# Patient Record
Sex: MALE | Race: WHITE | NOT HISPANIC OR LATINO | Employment: FULL TIME | URBAN - METROPOLITAN AREA
[De-identification: names, ages, dates, MRNs, and addresses within clinical notes are randomized per-mention and may not be internally consistent; named-entity substitution may affect disease eponyms.]

---

## 2018-07-20 ENCOUNTER — OFFICE VISIT (OUTPATIENT)
Dept: SLEEP MEDICINE | Facility: CLINIC | Age: 58
End: 2018-07-20
Payer: COMMERCIAL

## 2018-07-20 VITALS
HEART RATE: 74 BPM | BODY MASS INDEX: 40.43 KG/M2 | OXYGEN SATURATION: 94 % | DIASTOLIC BLOOD PRESSURE: 82 MMHG | WEIGHT: 315 LBS | HEIGHT: 74 IN | SYSTOLIC BLOOD PRESSURE: 147 MMHG | RESPIRATION RATE: 16 BRPM

## 2018-07-20 DIAGNOSIS — E66.01 MORBID OBESITY (H): ICD-10-CM

## 2018-07-20 DIAGNOSIS — I10 ESSENTIAL HYPERTENSION: ICD-10-CM

## 2018-07-20 DIAGNOSIS — R29.818 SUSPECTED SLEEP APNEA: Primary | ICD-10-CM

## 2018-07-20 DIAGNOSIS — E66.01 OBESITY, CLASS III, BMI 40-49.9 (MORBID OBESITY) (H): ICD-10-CM

## 2018-07-20 PROCEDURE — 99204 OFFICE O/P NEW MOD 45 MIN: CPT | Performed by: INTERNAL MEDICINE

## 2018-07-20 RX ORDER — ZOLPIDEM TARTRATE 5 MG/1
TABLET ORAL
Qty: 1 TABLET | Refills: 0 | Status: SHIPPED | OUTPATIENT
Start: 2018-07-20 | End: 2018-08-02

## 2018-07-20 RX ORDER — LISINOPRIL AND HYDROCHLOROTHIAZIDE 20; 25 MG/1; MG/1
TABLET ORAL
Refills: 3 | COMMUNITY
Start: 2017-11-29

## 2018-07-20 RX ORDER — METOPROLOL SUCCINATE 25 MG/1
TABLET, EXTENDED RELEASE ORAL
Refills: 0 | COMMUNITY
Start: 2018-07-07 | End: 2023-01-06

## 2018-07-20 NOTE — PATIENT INSTRUCTIONS
1. Sleep study to assess for sleep apnea  2. You can ask DOT examiner if a home sleep test ( Watch PAT) with verifications of chain of custody is acceptable to them   3. Follow up for sleep study     Your BMI is Body mass index is 40.7 kg/(m^2).  Weight management is a personal decision.  If you are interested in exploring weight loss strategies, the following discussion covers the approaches that may be successful. Body mass index (BMI) is one way to tell whether you are at a healthy weight, overweight, or obese. It measures your weight in relation to your height.  A BMI of 18.5 to 24.9 is in the healthy range. A person with a BMI of 25 to 29.9 is considered overweight, and someone with a BMI of 30 or greater is considered obese. More than two-thirds of American adults are considered overweight or obese.  Being overweight or obese increases the risk for further weight gain. Excess weight may lead to heart disease and diabetes.  Creating and following plans for healthy eating and physical activity may help you improve your health.  Weight control is part of healthy lifestyle and includes exercise, emotional health, and healthy eating habits. Careful eating habits lifelong are the mainstay of weight control. Though there are significant health benefits from weight loss, long-term weight loss with diet alone may be very difficult to achieve- studies show long-term success with dietary management in less than 10% of people. Attaining a healthy weight may be especially difficult to achieve in those with severe obesity. In some cases, medications, devices and surgical management might be considered.  What can you do?  If you are overweight or obese and are interested in methods for weight loss, you should discuss this with your provider.     Consider reducing daily calorie intake by 500 calories.     Keep a food journal.     Avoiding skipping meals, consider cutting portions instead.    Diet combined with exercise helps  maintain muscle while optimizing fat loss. Strength training is particularly important for building and maintaining muscle mass. Exercise helps reduce stress, increase energy, and improves fitness. Increasing exercise without diet control, however, may not burn enough calories to loose weight.       Start walking three days a week 10-20 minutes at a time    Work towards walking thirty minutes five days a week     Eventually, increase the speed of your walking for 1-2 minutes at time    In addition, we recommend that you review healthy lifestyles and methods for weight loss available through the National Institutes of Health patient information sites:  http://win.niddk.nih.gov/publications/index.htm    And look into health and wellness programs that may be available through your health insurance provider, employer, local community center, or cedric club.    Weight management plan: Patient was referred to their PCP to discuss a diet and exercise plan.       Patient to follow up with Primary Care provider regarding elevated blood pressure.

## 2018-07-20 NOTE — MR AVS SNAPSHOT
After Visit Summary   7/20/2018    Bk Bhatia    MRN: [id number]           Patient Information     Date Of Birth          1960        Visit Information        Provider Department      7/20/2018 12:30 PM Izaiah Kohler MD [facility] Instructions    1. Sleep study to assess for sleep apnea  2. You can ask DOT examiner if a home sleep test ( Watch PAT) with verifications of chain of custody is acceptable to them   3. Follow up for sleep study     Your BMI is Body mass index is 40.7 kg/(m^2).  Weight management is a personal decision.  If you are interested in exploring weight loss strategies, the following discussion covers the approaches that may be successful. Body mass index (BMI) is one way to tell whether you are at a healthy weight, overweight, or obese. It measures your weight in relation to your height.  A BMI of 18.5 to 24.9 is in the healthy range. A person with a BMI of 25 to 29.9 is considered overweight, and someone with a BMI of 30 or greater is considered obese. More than two-thirds of American adults are considered overweight or obese.  Being overweight or obese increases the risk for further weight gain. Excess weight may lead to heart disease and diabetes.  Creating and following plans for healthy eating and physical activity may help you improve your health.  Weight control is part of healthy lifestyle and includes exercise, emotional health, and healthy eating habits. Careful eating habits lifelong are the mainstay of weight control. Though there are significant health benefits from weight loss, long-term weight loss with diet alone may be very difficult to achieve- studies show long-term success with dietary management in less than 10% of people. Attaining a healthy weight may be especially difficult to achieve in those with severe obesity. In some cases, medications, devices and surgical management might be considered.  What can you do?  If you  are overweight or obese and are interested in methods for weight loss, you should discuss this with your provider.     Consider reducing daily calorie intake by 500 calories.     Keep a food journal.     Avoiding skipping meals, consider cutting portions instead.    Diet combined with exercise helps maintain muscle while optimizing fat loss. Strength training is particularly important for building and maintaining muscle mass. Exercise helps reduce stress, increase energy, and improves fitness. Increasing exercise without diet control, however, may not burn enough calories to loose weight.       Start walking three days a week 10-20 minutes at a time    Work towards walking thirty minutes five days a week     Eventually, increase the speed of your walking for 1-2 minutes at time    In addition, we recommend that you review healthy lifestyles and methods for weight loss available through the National Institutes of Health patient information sites:  http://win.niddk.nih.gov/publications/index.htm    And look into health and wellness programs that may be available through your health insurance provider, employer, local community center, or cedric club.    Weight management plan: Patient was referred to their PCP to discuss a diet and exercise plan.       Patient to follow up with Primary Care provider regarding elevated blood pressure.                  Follow-ups after your visit        Your next 10 appointments already scheduled     Jul 31, 2018  8:00 PM CDT   PSG Split with SLEEP LAB, BED TWO   [facility]    [address]  [facility] [address]   [phone]            Aug 15, 2018  2:30 PM CDT   Return Sleep Patient with PAMELLA Hoff   [facility]    [address]  [facility] [address]   [phone]              Who to contact     If you have questions or need follow up information about  "today's clinic visit or your schedule please contact [facility] directly at [phone].  Normal or non-critical lab and imaging results will be communicated to you by MyChart, letter or phone within 4 business days after the clinic has received the results. If you do not hear from us within 7 days, please contact the clinic through MyChart or phone. If you have a critical or abnormal lab result, we will notify you by phone as soon as possible.  Submit refill requests through [employer] or call your pharmacy and they will forward the refill request to us. Please allow 3 business days for your refill to be completed.          Additional Information About Your Visit        Narushart Information     [employer] lets you send messages to your doctor, view your test results, renew your prescriptions, schedule appointments and more. To sign up, go to [email or web address] . Click on \"Log in\" on the left side of the screen, which will take you to the Welcome page. Then click on \"Sign up Now\" on the right side of the page.     You will be asked to enter the access code listed below, as well as some personal information. Please follow the directions to create your username and password.     Your access code is: [email or web address]  Expires: 10/18/2018 12:58 PM     Your access code will  in 90 days. If you need help or a new code, please call your [place] clinic or [phone].        Care EveryWhere ID     This is your Care EveryWhere ID. This could be used by other organizations to access your [place] medical records  [id number]        Your Vitals Were     Pulse Respirations Height Pulse Oximetry BMI (Body Mass Index)       74 16 1.88 m (6' 2\") 94% 40.7 kg/m2        Blood Pressure from Last 3 Encounters:   18 147/82    Weight from Last 3 Encounters:   18 143.8 kg (317 lb)              Today, you had the following     No orders found for display       Primary Care Provider Fax #    Physician No " Ref-Primary [phone]       No address on file        Equal Access to Services     LENIN HERRERAQUINTIN : Hadii rajani sanders aneta Flower, wadanielda davidwilderha, prabhu cirotayler jadaprashant, catalina johnsonin hayaazari eliasjeanine jasminapaulinoneema millanamyzari jc. So [facility] [phone].    ATENCIÓN: Si habla español, tiene a evangelista disposición servicios gratuitos de asistencia lingüística. Llame al [phone].    We comply with applicable federal civil rights laws and [place] laws. We do not discriminate on the basis of race, color, national origin, age, disability, sex, sexual orientation, or gender identity.            Thank you!     Thank you for choosing [facility]  for your care. Our goal is always to provide you with excellent care. Hearing back from our patients is one way we can continue to improve our services. Please take a few minutes to complete the written survey that you may receive in the mail after your visit with us. Thank you!             Your Updated Medication List - Protect others around you: Learn how to safely use, store and throw away your medicines at www.disposemymeds.org.          This list is accurate as of 7/20/18  1:15 PM.  Always use your most recent med list.                   Brand Name Dispense Instructions for use Diagnosis    lisinopril-hydrochlorothiazide 20-25 MG per tablet    PRINZIDE/ZESTORETIC     TK 1 T PO QD        metoprolol succinate 25 MG 24 hr tablet    TOPROL-XL     TK 1 T PO QD

## 2018-07-20 NOTE — NURSING NOTE
"Chief Complaint   Patient presents with     Sleep Problem     Referrd by DOT       Initial /80  Pulse 74  Resp 16  Ht 1.88 m (6' 2\")  Wt 143.8 kg (317 lb)  SpO2 94%  BMI 40.7 kg/m2 Estimated body mass index is 40.7 kg/(m^2) as calculated from the following:    Height as of this encounter: 1.88 m (6' 2\").    Weight as of this encounter: 143.8 kg (317 lb).    Medication Reconciliation: complete    Neck circumference: 17.5 inches / 45 centimeters.    ESS 0    Naomie Ochoa MA      "

## 2018-07-20 NOTE — PROGRESS NOTES
Sleep Consultation:    Date on this visit: 7/20/2018    Bk Bhatia is sent by DOT physician for a sleep consultation regarding sleep apnea evaluation.    Primary Physician: No Ref-Primary, Physician     Chief complaint: DOT mandated evaluation     Presenting History:     Bk Bhatia was assessed to be at high risk for sleep apnea by his DOT examiner and advised to have a sleep evaluation.      Bk does not think he has significant or frequent snoring. He may have rare snoring. Patient does have a regular bed partner. There is not report of gasping and snorting.  He does not have witnessed apneas. They never sleep separately.  Patient sleeps on his side. He denies no snort arousals, morning dry mouth, morning headaches and restless legs. Bk denies any bruxism, sleep walking, sleep talking, dream enactment, sleep paralysis, cataplexy and hypnogogic/hypnopompic hallucinations.    Bk goes to sleep at 5:00 AM during the week. He wakes up at 12:00 PM without an alarm. He falls asleep in 15 minutes.  Bk denies difficulty falling asleep.  He wakes up 1-2 times a night for 5 minutes before falling back to sleep.  Bk wakes up to go to the bathroom.  On weekends, Bk goes to sleep at 9:00 PM.  He wakes up at 7:00 AM without an alarm. He falls asleep in 15 minutes.  Patient gets an average of 7 hours of sleep per night.     Patient does not use electronics in bed and watch TV in bed.     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      He denies sleep walking as a child.  Bk denies difficulty breathing through his nose.       Patient's [place] Sleepiness score 0/24 consistent with no daytime sleepiness.      Bk naps 0 times per week . He takes no inadvertant naps.  He denies closing eyes, dozing and falling asleep while driving.  Patient was counseled on the importance of driving while alert, to pull over if drowsy, or nap before getting into the vehicle if sleepy.      He uses no  caffeine.     Allergies:    Not on File    Medications:    Current Outpatient Prescriptions   Medication Sig Dispense Refill     lisinopril-hydrochlorothiazide (PRINZIDE/ZESTORETIC) 20-25 MG per tablet TK 1 T PO QD  3     metoprolol succinate (TOPROL-XL) 25 MG 24 hr tablet TK 1 T PO QD  0       Problem List:  There are no active problems to display for this patient.       Past Medical/Surgical History:    - Hypertension     Social History:  Social History     Social History     Marital status:      Spouse name: N/A     Number of children: N/A     Years of education: N/A     Occupational History     Not on file.     Social History Main Topics     Smoking status: Not on file     Smokeless tobacco: Not on file     Alcohol use Not on file     Drug use: Not on file     Sexual activity: Not on file     Other Topics Concern     Not on file     Social History Narrative       Family History:    No family history of sleep disorders.     Review of Systems:  A complete review of systems reviewed by me is negative with the exeption of what has been mentioned in the history of present illness.  CONSTITUTIONAL: NEGATIVE for weight gain/loss, fever, chills, sweats or night sweats, drug allergies.  EYES: NEGATIVE for changes in vision, blind spots, double vision.  ENT: NEGATIVE for ear pain, sore throat, sinus pain, post-nasal drip, runny nose, bloody nose  CARDIAC: NEGATIVE for fast heartbeats or fluttering in chest, chest pain or pressure, breathlessness when lying flat, swollen legs or swollen feet.  NEUROLOGIC: NEGATIVE headaches, weakness or numbness in the arms or legs.  DERMATOLOGIC: NEGATIVE for rashes, new moles or change in mole(s)  PULMONARY: NEGATIVE SOB at rest, SOB with activity, dry cough, productive cough, coughing up blood, wheezing or whistling when breathing.    GASTROINTESTINAL: NEGATIVE for nausea or vomitting, loose or watery stools, fat or grease in stools, constipation, abdominal pain, bowel movements  "black in color or blood noted.  GENITOURINARY: NEGATIVE for pain during urination, blood in urine, urinating more frequently than usual, irregular menstrual periods.  MUSCULOSKELETAL: NEGATIVE for muscle pain, bone or joint pain, swollen joints.  ENDOCRINE: NEGATIVE for increased thirst or urination, diabetes.  LYMPHATIC: NEGATIVE for swollen lymph nodes, lumps or bumps in the breasts or nipple discharge.    Physical Examination:  Vitals: /82  Pulse 74  Resp 16  Ht 1.88 m (6' 2\")  Wt 143.8 kg (317 lb)  SpO2 94%  BMI 40.7 kg/m2  BMI= Body mass index is 40.7 kg/(m^2).    Neck Cir (cm): 45 cm    [place] Total Score 7/20/2018   Total score - [place] 0       ISRA Total Score: 1 (07/20/18 1220)    GENERAL APPEARANCE: healthy, alert and no distress  EYES: Eyes grossly normal to inspection, PERRL and conjunctivae and sclerae normal  HENT: nose and mouth without ulcers or lesions, oropharynx crowded and soft palate dependent  NECK: no adenopathy, no asymmetry, masses, or scars and thyroid normal to palpation  RESP: lungs clear to auscultation - no rales, rhonchi or wheezes  ABDOMEN: soft, nontender, without hepatosplenomegaly or masses  MS: extremities normal- no gross deformities noted  NEURO: Normal strength and tone, mentation intact and speech normal  PSYCH: mentation appears normal and affect normal/bright  Mallampati Class: III.  Tonsillar Stage: 1  hidden by pillars.    Impression/Plan:    1. To rule out obstructive sleep apnea  2. Hypertension     - Patient is a 58 years old male, with BMI 40, neck circumference 45 cm and medical comorbidity of hypertension who has been sent for an evaluation of sleep apnea by DOT examiner. He has a crowded oropharynx on examination. Demographically and anatomically, there is an intermediate to high risk for sleep apnea. Although patient denies significant clinically symptoms of sleep disordered breathing, an overnight sleep study is recommended.     Plan:     1. Split " night PSG for assessment of sleep apnea       He will follow up with me in approximately two weeks after his sleep study has been competed to review the results and discuss plan of care.       Polysomnography reviewed.  Obstructive sleep apnea reviewed.  Complications of untreated sleep apnea were reviewed.    I spent a total of 30 minutes with patient with more than 50% in counseling     Izaiah Kohler     CC: No ref. provider found

## 2018-08-01 ENCOUNTER — THERAPY VISIT (OUTPATIENT)
Dept: SLEEP MEDICINE | Facility: CLINIC | Age: 58
End: 2018-08-01
Payer: COMMERCIAL

## 2018-08-01 DIAGNOSIS — I10 ESSENTIAL HYPERTENSION: ICD-10-CM

## 2018-08-01 DIAGNOSIS — E66.01 OBESITY, CLASS III, BMI 40-49.9 (MORBID OBESITY) (H): ICD-10-CM

## 2018-08-01 DIAGNOSIS — R29.818 SUSPECTED SLEEP APNEA: ICD-10-CM

## 2018-08-01 PROCEDURE — 95810 POLYSOM 6/> YRS 4/> PARAM: CPT | Performed by: FAMILY MEDICINE

## 2018-08-01 NOTE — MR AVS SNAPSHOT
"              After Visit Summary   8/1/2018    Bk Bhatia    MRN: [id number]           Patient Information     Date Of Birth          1960        Visit Information        Provider Department      8/1/2018 8:00 PM SLEEP LAB, BED THREE [facility]        Today's Diagnoses     Suspected sleep apnea        Obesity, Class III, BMI 40-49.9 (morbid obesity) (H)        Essential hypertension           Follow-ups after your visit        Your next 10 appointments already scheduled     Aug 02, 2018  7:30 AM CDT   Return Sleep Patient with Andrew Jenkins MD   [facility]    [address]  [facility] [address]   [phone]              Who to contact     If you have questions or need follow up information about today's clinic visit or your schedule please contact [facility] directly at [phone].  Normal or non-critical lab and imaging results will be communicated to you by MyChart, letter or phone within 4 business days after the clinic has received the results. If you do not hear from us within 7 days, please contact the clinic through [employer] or phone. If you have a critical or abnormal lab result, we will notify you by phone as soon as possible.  Submit refill requests through [employer] or call your pharmacy and they will forward the refill request to us. Please allow 3 business days for your refill to be completed.          Additional Information About Your Visit        Re-[employer] Information     [employer] lets you send messages to your doctor, view your test results, renew your prescriptions, schedule appointments and more. To sign up, go to [email or web address] . Click on \"Log in\" on the left side of the screen, which will take you to the Welcome page. Then click on \"Sign up Now\" on the right side of the page.     You will be asked to enter the access code listed below, as well as some personal " information. Please follow the directions to create your username and password.     Your access code is: [email or web address]  Expires: 10/18/2018 12:58 PM     Your access code will  in 90 days. If you need help or a new code, please call your [place] clinic or [phone].        Care EveryWhere ID     This is your Care EveryWhere ID. This could be used by other organizations to access your [place] medical records  [id number]         Blood Pressure from Last 3 Encounters:   18 147/82    Weight from Last 3 Encounters:   18 143.8 kg (317 lb)              We Performed the Following     Comprehensive Sleep Study        Primary Care Provider Fax #    Physician No Ref-Primary [phone]       No address on file        Equal Access to Services     LENIN LOPEZ : Gama Flower, waaxda luqadaha, qaybta kaalmada krystayajeanne, catalina boyd . So [facility] [phone].    ATENCIÓN: Si habla español, tiene a evangelista disposición servicios gratuitos de asistencia lingüística. Llame al [phone].    We comply with applicable federal civil rights laws and [place] laws. We do not discriminate on the basis of race, color, national origin, age, disability, sex, sexual orientation, or gender identity.            Thank you!     Thank you for choosing [facility]  for your care. Our goal is always to provide you with excellent care. Hearing back from our patients is one way we can continue to improve our services. Please take a few minutes to complete the written survey that you may receive in the mail after your visit with us. Thank you!             Your Updated Medication List - Protect others around you: Learn how to safely use, store and throw away your medicines at www.disposemymeds.org.          This list is accurate as of 18 11:59 PM.  Always use your most recent med list.                   Brand Name Dispense Instructions for use Diagnosis     lisinopril-hydrochlorothiazide 20-25 MG per tablet    PRINZIDE/ZESTORETIC     TK 1 T PO QD        metoprolol succinate 25 MG 24 hr tablet    TOPROL-XL     TK 1 T PO QD        zolpidem 5 MG tablet    AMBIEN    1 tablet    Take tablet by mouth 15 minutes prior to sleep, for Sleep Study

## 2018-08-02 ENCOUNTER — OFFICE VISIT (OUTPATIENT)
Dept: SLEEP MEDICINE | Facility: CLINIC | Age: 58
End: 2018-08-02
Payer: COMMERCIAL

## 2018-08-02 VITALS
OXYGEN SATURATION: 98 % | DIASTOLIC BLOOD PRESSURE: 88 MMHG | SYSTOLIC BLOOD PRESSURE: 140 MMHG | BODY MASS INDEX: 40.43 KG/M2 | WEIGHT: 315 LBS | HEART RATE: 60 BPM | HEIGHT: 74 IN

## 2018-08-02 DIAGNOSIS — R00.1 SINUS BRADYCARDIA: ICD-10-CM

## 2018-08-02 DIAGNOSIS — G47.33 OSA (OBSTRUCTIVE SLEEP APNEA): Primary | ICD-10-CM

## 2018-08-02 PROCEDURE — 99214 OFFICE O/P EST MOD 30 MIN: CPT | Performed by: FAMILY MEDICINE

## 2018-08-02 NOTE — NURSING NOTE
"Chief Complaint   Patient presents with     Study Results     Discuss slep study       Initial /88  Pulse 60  Ht 1.88 m (6' 2.02\")  Wt 143.8 kg (317 lb)  SpO2 98%  BMI 40.68 kg/m2 Estimated body mass index is 40.68 kg/(m^2) as calculated from the following:    Height as of this encounter: 1.88 m (6' 2.02\").    Weight as of this encounter: 143.8 kg (317 lb).    Medication Reconciliation: complete      "

## 2018-08-02 NOTE — PROGRESS NOTES
"    Chief Complaint   Patient presents with     Study Results     Discuss slep study       Bk Bhatia is a 58 year old male who returns to [facility] for review of sleep testing results. He presented with risk factors for VELIA identified during DOT examination.    7/20/2018 - Initial consult with Dr. Kohler.  Increased risk factors for VELIA based on weight, age, gender, co-morbid HTN.  But, no reported daytime sxs consistent with VELIA.  A/P for sleep testing.    Today - Patient being seen morning after study, so some detailed information has not been generated by computer system, but I reviewed the study in its entirety.    Weight 317 lbs, AHI ~13, michael SpO2 72%, baseline SpO2 ~94%, time of SpO2 <= 88% of 8.4 minutes.  Events were largely confined to REM (both lateral and supine).  Also seen to have sinus bradycardia with average heart of ~49-50 bpm.  At start of study, HR was ~55 bpm with gradual dipping to low-40's by end of sleep period.  Appears to be sinus rhythm.    Estimated body mass index is 40.68 kg/(m^2) as calculated from the following:    Height as of this encounter: 1.88 m (6' 2.02\").    Weight as of this encounter: 143.8 kg (317 lb).  Total score - [place]: 0 (7/20/2018 12:20 PM)      Problem List:  Patient Active Problem List    Diagnosis Date Noted     Morbid obesity (H) 07/20/2018     Priority: Medium        /88  Pulse 60  Ht 1.88 m (6' 2.02\")  Wt 143.8 kg (317 lb)  SpO2 98%  BMI 40.68 kg/m2    Impression/Plan:    1.)  Mild VELIA with mild sleep-associated hypoxemia  2.)  Sinus bradycardia   - Given his lack of daytime symptoms, AHI < 15, I would not see a strong indication for treatment at this time.   - I will contact his primary care provider to consider decrease in dosage or potential discontinuation of metoprolol given nocturnal sinus bradycardia with HR's in low 40's.   - Recommended weight management    Twenty-five minutes spent with patient, all of which were " spent face-to-face counseling, consulting, coordinating plan of care.      Andrew Jenkins MD, MD    CC:  No Ref-Primary, Physician (only for reference, does not automatically route).

## 2018-08-02 NOTE — PATIENT INSTRUCTIONS

## 2018-08-02 NOTE — MR AVS SNAPSHOT
After Visit Summary   8/2/2018    Bk Bhatia    MRN: [id number]           Patient Information     Date Of Birth          1960        Visit Information        Provider Department      8/2/2018 7:30 AM Andrew Jenkins MD [facility]        Today's Diagnoses     VELIA (obstructive sleep apnea)    -  1    Sinus bradycardia          Care Instructions      Your BMI is Body mass index is 40.68 kg/(m^2).  Weight management is a personal decision.  If you are interested in exploring weight loss strategies, the following discussion covers the approaches that may be successful. Body mass index (BMI) is one way to tell whether you are at a healthy weight, overweight, or obese. It measures your weight in relation to your height.  A BMI of 18.5 to 24.9 is in the healthy range. A person with a BMI of 25 to 29.9 is considered overweight, and someone with a BMI of 30 or greater is considered obese. More than two-thirds of American adults are considered overweight or obese.  Being overweight or obese increases the risk for further weight gain. Excess weight may lead to heart disease and diabetes.  Creating and following plans for healthy eating and physical activity may help you improve your health.  Weight control is part of healthy lifestyle and includes exercise, emotional health, and healthy eating habits. Careful eating habits lifelong are the mainstay of weight control. Though there are significant health benefits from weight loss, long-term weight loss with diet alone may be very difficult to achieve- studies show long-term success with dietary management in less than 10% of people. Attaining a healthy weight may be especially difficult to achieve in those with severe obesity. In some cases, medications, devices and surgical management might be considered.  What can you do?  If you are overweight or obese and are interested in methods for weight loss, you should discuss this with  your provider.     Consider reducing daily calorie intake by 500 calories.     Keep a food journal.     Avoiding skipping meals, consider cutting portions instead.    Diet combined with exercise helps maintain muscle while optimizing fat loss. Strength training is particularly important for building and maintaining muscle mass. Exercise helps reduce stress, increase energy, and improves fitness. Increasing exercise without diet control, however, may not burn enough calories to loose weight.       Start walking three days a week 10-20 minutes at a time    Work towards walking thirty minutes five days a week     Eventually, increase the speed of your walking for 1-2 minutes at time    In addition, we recommend that you review healthy lifestyles and methods for weight loss available through the National Institutes of Health patient information sites:  http://win.niddk.nih.gov/publications/index.htm    And look into health and wellness programs that may be available through your health insurance provider, employer, local community center, or cedric club.    Weight management plan: Patient was referred to their PCP to discuss a diet and exercise plan.  2      Your Body mass index is 40.68 kg/(m^2).  Weight management is a personal decision.  If you are interested in exploring weight loss strategies, the following discussion covers the approaches that may be successful. Body mass index (BMI) is one way to tell whether you are at a healthy weight, overweight, or obese. It measures your weight in relation to your height.  A BMI of 18.5 to 24.9 is in the healthy range. A person with a BMI of 25 to 29.9 is considered overweight, and someone with a BMI of 30 or greater is considered obese. More than two-thirds of American adults are considered overweight or obese.  Being overweight or obese increases the risk for further weight gain. Excess weight may lead to heart disease and diabetes.  Creating and following plans for healthy  eating and physical activity may help you improve your health.  Weight control is part of healthy lifestyle and includes exercise, emotional health, and healthy eating habits. Careful eating habits lifelong are the mainstay of weight control. Though there are significant health benefits from weight loss, long-term weight loss with diet alone may be very difficult to achieve- studies show long-term success with dietary management in less than 10% of people. Attaining a healthy weight may be especially difficult to achieve in those with severe obesity. In some cases, medications, devices and surgical management might be considered.  What can you do?  If you are overweight or obese and are interested in methods for weight loss, you should discuss this with your provider.     Consider reducing daily calorie intake by 500 calories.     Keep a food journal.     Avoiding skipping meals, consider cutting portions instead.    Diet combined with exercise helps maintain muscle while optimizing fat loss. Strength training is particularly important for building and maintaining muscle mass. Exercise helps reduce stress, increase energy, and improves fitness. Increasing exercise without diet control, however, may not burn enough calories to loose weight.       Start walking three days a week 10-20 minutes at a time    Work towards walking thirty minutes five days a week     Eventually, increase the speed of your walking for 1-2 minutes at time    In addition, we recommend that you review healthy lifestyles and methods for weight loss available through the National Institutes of Health patient information sites:  http://win.niddk.nih.gov/publications/index.htm    And look into health and wellness programs that may be available through your health insurance provider, employer, local community center, or cedric club.    Weight management plan: Patient was referred to their PCP to discuss a diet and exercise plan.          Follow-ups  "after your visit        Follow-up notes from your care team     Return if symptoms worsen or fail to improve.      Who to contact     If you have questions or need follow up information about today's clinic visit or your schedule please contact [facility] directly at [phone].  Normal or non-critical lab and imaging results will be communicated to you by MyChart, letter or phone within 4 business days after the clinic has received the results. If you do not hear from us within 7 days, please contact the clinic through MyChart or phone. If you have a critical or abnormal lab result, we will notify you by phone as soon as possible.  Submit refill requests through [employer] or call your pharmacy and they will forward the refill request to us. Please allow 3 business days for your refill to be completed.          Additional Information About Your Visit        MyChart Information     [employer] lets you send messages to your doctor, view your test results, renew your prescriptions, schedule appointments and more. To sign up, go to [email or web address] . Click on \"Log in\" on the left side of the screen, which will take you to the Welcome page. Then click on \"Sign up Now\" on the right side of the page.     You will be asked to enter the access code listed below, as well as some personal information. Please follow the directions to create your username and password.     Your access code is: [email or web address]  Expires: 10/18/2018 12:58 PM     Your access code will  in 90 days. If you need help or a new code, please call your [facility] or [phone].        Care EveryWhere ID     This is your Care EveryWhere ID. This could be used by other organizations to access your [place] medical records  [id number]        Your Vitals Were     Pulse Height Pulse Oximetry BMI (Body Mass Index)          60 1.88 m (6' 2.02\") 98% 40.68 kg/m2         Blood Pressure from Last 3 Encounters:   18 140/88 "   07/20/18 147/82    Weight from Last 3 Encounters:   08/02/18 143.8 kg (317 lb)   07/20/18 143.8 kg (317 lb)              Today, you had the following     No orders found for display       Primary Care Provider Fax #    Physician No Ref-Primary [phone]       No address on file        Equal Access to Services     [facility]A [facility] : Hadii aad ku hadasho Soomaali, waaxda luqadaha, qaybta kaalmada adejeanineyada, catalina lópez meganzari chaparro warrenneema boyd . So [facility] [phone].    ATENCIÓN: Si habla español, tiene a evangelista disposición servicios gratuitos de asistencia lingüística. Llame al [phone].    We comply with applicable federal civil rights laws and [place] laws. We do not discriminate on the basis of race, color, national origin, age, disability, sex, sexual orientation, or gender identity.            Thank you!     Thank you for choosing [facility][note 3]  for your care. Our goal is always to provide you with excellent care. Hearing back from our patients is one way we can continue to improve our services. Please take a few minutes to complete the written survey that you may receive in the mail after your visit with us. Thank you!             Your Updated Medication List - Protect others around you: Learn how to safely use, store and throw away your medicines at www.disposemymeds.org.          This list is accurate as of 8/2/18 10:19 AM.  Always use your most recent med list.                   Brand Name Dispense Instructions for use Diagnosis    lisinopril-hydrochlorothiazide 20-25 MG per tablet    PRINZIDE/ZESTORETIC     TK 1 T PO QD        metoprolol succinate 25 MG 24 hr tablet    TOPROL-XL     TK 1 T PO QD

## 2018-08-03 LAB — SLPCOMP: NORMAL

## 2023-01-06 ENCOUNTER — HOSPITAL ENCOUNTER (EMERGENCY)
Facility: CLINIC | Age: 63
Discharge: HOME | End: 2023-01-06
Attending: EMERGENCY MEDICINE | Admitting: EMERGENCY MEDICINE
Payer: COMMERCIAL

## 2023-01-06 VITALS
DIASTOLIC BLOOD PRESSURE: 1 MMHG | SYSTOLIC BLOOD PRESSURE: 166 MMHG | HEIGHT: 74 IN | BODY MASS INDEX: 40.43 KG/M2 | HEART RATE: 66 BPM | OXYGEN SATURATION: 98 % | WEIGHT: 315 LBS | TEMPERATURE: 98.5 F | RESPIRATION RATE: 18 BRPM

## 2023-01-06 DIAGNOSIS — Z90.79 HISTORY OF RADICAL PROSTATECTOMY: ICD-10-CM

## 2023-01-06 DIAGNOSIS — N30.00 ACUTE CYSTITIS WITHOUT HEMATURIA: ICD-10-CM

## 2023-01-06 DIAGNOSIS — Z85.46 HISTORY OF PROSTATE CANCER: ICD-10-CM

## 2023-01-06 LAB
ALBUMIN UR-MCNC: >499 MG/DL
APPEARANCE UR: ABNORMAL
BACTERIA #/AREA URNS HPF: ABNORMAL /HPF
BILIRUB UR QL STRIP: NEGATIVE
COLOR UR AUTO: ABNORMAL
GLUCOSE UR STRIP-MCNC: NEGATIVE MG/DL
HGB UR QL STRIP: ABNORMAL
KETONES UR STRIP-MCNC: 5 MG/DL
LEUKOCYTE ESTERASE UR QL STRIP: ABNORMAL
MUCOUS THREADS #/AREA URNS LPF: PRESENT /LPF
NITRATE UR QL: POSITIVE
PH UR STRIP: 6 [PH] (ref 5–7)
RBC URINE: >182 /HPF
SP GR UR STRIP: 1.02 (ref 1–1.03)
SQUAMOUS EPITHELIAL: 1 /HPF
TRANSITIONAL EPI: 4 /HPF
UROBILINOGEN UR STRIP-MCNC: NORMAL MG/DL
WBC CLUMPS #/AREA URNS HPF: PRESENT /HPF
WBC URINE: >182 /HPF

## 2023-01-06 PROCEDURE — 81001 URINALYSIS AUTO W/SCOPE: CPT | Performed by: EMERGENCY MEDICINE

## 2023-01-06 PROCEDURE — 99284 EMERGENCY DEPT VISIT MOD MDM: CPT | Performed by: EMERGENCY MEDICINE

## 2023-01-06 PROCEDURE — 99284 EMERGENCY DEPT VISIT MOD MDM: CPT

## 2023-01-06 PROCEDURE — 87076 CULTURE ANAEROBE IDENT EACH: CPT | Performed by: EMERGENCY MEDICINE

## 2023-01-06 RX ORDER — MULTIVITAMIN WITH IRON
500-750 TABLET ORAL DAILY
COMMUNITY
End: 2023-01-06

## 2023-01-06 RX ORDER — METOPROLOL SUCCINATE 50 MG/1
50 TABLET, EXTENDED RELEASE ORAL DAILY
COMMUNITY
Start: 2022-10-26

## 2023-01-06 RX ORDER — PHENAZOPYRIDINE HYDROCHLORIDE 200 MG/1
200 TABLET, FILM COATED ORAL 3 TIMES DAILY PRN
Qty: 12 TABLET | Refills: 0 | Status: SHIPPED | OUTPATIENT
Start: 2023-01-06

## 2023-01-06 RX ORDER — CIPROFLOXACIN 500 MG/1
500 TABLET, FILM COATED ORAL 2 TIMES DAILY
Qty: 14 TABLET | Refills: 0 | Status: SHIPPED | OUTPATIENT
Start: 2023-01-06 | End: 2023-01-13

## 2023-01-06 ASSESSMENT — ACTIVITIES OF DAILY LIVING (ADL)
ADLS_ACUITY_SCORE: 35
ADLS_ACUITY_SCORE: 33

## 2023-01-06 NOTE — ED PROVIDER NOTES
History     Chief Complaint   Patient presents with     Urinary Retention     HPI  Bk Bhatia is a 62 year old male  with history of prostate cancer and chronic incontinence, status post radical prostatectomy in 1/31/12 who presents with suprapubic abdominal pressure and inability to urinate normally, spontaneously, beginning last evening.  Only voiding in small amounts with no dysuria or hematuria.  Noticed some small dark/brown spots on the shower floor this morning, ?  evidence of UTI.  No back or flank pain, fever or chills.   Prior to yesterday he had had no acute change in his postoperative urinary output or urinary habits since his radical prostatectomy in 2012.  Reports he is normally able to empty his bladder and is only adverse effectiveness prior prostate cancer and surgery is chronic urinary incontinence.    Allergies:  No Known Allergies    Problem List:    Patient Active Problem List    Diagnosis Date Noted     VELIA (obstructive sleep apnea) 08/02/2018     Priority: Medium     Mild VELIA with mild sleep-associated hypoxemia    PSG on 8/1/2018.  Weight 317 lbs, AHI ~13, michael SpO2 72%, baseline SpO2 ~94%, time of SpO2 <= 88% of 8.4 minutes.  Events were largely confined to REM (both lateral and supine).  Also seen to have sinus bradycardia with average heart of ~49-50 bpm.  At start of study, HR was ~55 bpm with gradual dipping to low-40's by end of sleep period.  Appears to be sinus rhythm.       Sinus bradycardia 08/02/2018     Priority: Medium     Morbid obesity (H) 07/20/2018     Priority: Medium        Past Medical History:    No past medical history on file.    Past Surgical History:    No past surgical history on file.    Family History:    No family history on file.    Social History:  Marital Status:   [2]        Medications:    cholecalciferol 50 MCG (2000 UT) CAPS  ciprofloxacin (CIPRO) 500 MG tablet  lisinopril-hydrochlorothiazide (PRINZIDE/ZESTORETIC) 20-25 MG per  "tablet  metoprolol succinate ER (TOPROL XL) 50 MG 24 hr tablet  Multiple Vitamin (MULTI VITAMIN DAILY PO)  phenazopyridine (PHENAZO) 200 MG tablet        Review of Systems    Physical Exam   BP: (!) 166/1  Pulse: 66  Temp: 98.5  F (36.9  C)  Resp: 18  Height: 188 cm (6' 2\")  Weight: 145.2 kg (320 lb)  SpO2: 98 %      Physical Exam  Vitals and nursing note reviewed.   Constitutional:       General: He is not in acute distress.     Appearance: Normal appearance. He is well-developed. He is not ill-appearing or diaphoretic.   HENT:      Head: Normocephalic and atraumatic.      Right Ear: External ear normal.      Left Ear: External ear normal.      Nose: Nose normal.   Eyes:      General: No scleral icterus.     Extraocular Movements: Extraocular movements intact.      Conjunctiva/sclera: Conjunctivae normal.   Neck:      Trachea: No tracheal deviation.   Cardiovascular:      Rate and Rhythm: Normal rate and regular rhythm.      Heart sounds: Normal heart sounds. No murmur heard.    No friction rub. No gallop.   Pulmonary:      Effort: Pulmonary effort is normal. No respiratory distress.      Breath sounds: Normal breath sounds. No wheezing, rhonchi or rales.   Abdominal:      General: There is no distension.      Palpations: Abdomen is soft. There is no mass.      Tenderness: There is no abdominal tenderness. There is no right CVA tenderness, left CVA tenderness, guarding or rebound.      Hernia: A hernia (Nontender reducible umbilical hernia) is present.   Musculoskeletal:         General: No swelling or tenderness. Normal range of motion.      Cervical back: Normal range of motion and neck supple.      Right lower leg: No edema.      Left lower leg: No edema.   Skin:     General: Skin is warm and dry.      Coloration: Skin is not pale.      Findings: No erythema.   Neurological:      General: No focal deficit present.      Mental Status: He is alert and oriented to person, place, and time.   Psychiatric:         " Mood and Affect: Mood normal.         Behavior: Behavior normal.         ED Course             Procedures              Results for orders placed or performed during the hospital encounter of 01/06/23 (from the past 24 hour(s))   UA with Microscopic reflex to Culture    Specimen: Urine, Catheter   Result Value Ref Range    Color Urine Erum (A) Colorless, Straw, Light Yellow, Yellow    Appearance Urine Cloudy (A) Clear    Glucose Urine Negative Negative mg/dL    Bilirubin Urine Negative Negative    Ketones Urine 5 (A) Negative mg/dL    Specific Gravity Urine 1.022 1.003 - 1.035    Blood Urine Large (A) Negative    pH Urine 6.0 5.0 - 7.0    Protein Albumin Urine >499 (A) Negative mg/dL    Urobilinogen Urine Normal Normal, 2.0 mg/dL    Nitrite Urine Positive (A) Negative    Leukocyte Esterase Urine Moderate (A) Negative    Bacteria Urine Few (A) None Seen /HPF    WBC Clumps Urine Present (A) None Seen /HPF    Mucus Urine Present (A) None Seen /LPF    RBC Urine >182 (H) <=2 /HPF    WBC Urine >182 (H) <=5 /HPF    Squamous Epithelials Urine 1 <=1 /HPF    Transitional Epithelials Urine 4 (H) <=1 /HPF    Narrative    Urine Culture ordered based on laboratory criteria       Medications - No data to display    Post void bladder scan: < 50 ml.    Assessments & Plan (with Medical Decision Making)   62-year-old male with history of prostate cancer status post radical prostatectomy in January 2012 and chronic urinary incontinence with symptoms of benign UTI beginning last evening.  No back or flank pain, fever chills or vomiting.  Post void residual less than 50 mL.  He has been doing well postoperatively with no history of urinary retention stable PSAs.  He has follow-up 2-year PSA testing scheduled for 1/26 later this month.  He appears stable and appropriate for discharge with Rx ciprofloxacin, and Pyridium to use as needed.  Urine culture pending.  No apparent current indication for imaging evaluation and no evidence of  pyelonephritis, doubt stone or obstructing cause of UTI.  He and his wife were provided instructions for supportive care and will return as needed for worsened condition or worsening symptoms, or new problems or concerns.    I have reviewed the nursing notes.    I have reviewed the findings, diagnosis, plan and need for follow up with the patient.      Medical Decision Making  The patient presented with a problem that is acute and uncomplicated.    The patient's evaluation involved:  review of 1 prior external note(s) ([facility], in Care Everywhere EMR)  ordering and review of 1 test(s) (UA)  review of 1 test result(s) ordered prior to this encounter (01/22/2021 PSA, Total, Serum or Plasma BLDV     PSA, Total <0.10 NG/mL <4.0 NG/mL Final [facility]: [address] )  strong consideration of a test (CT abdomen/pelvis to rule out obstructing cause of acute UTI in patient with history of prostate cancer) that was ultimately deferred    The patient's management involved prescription drug management.        New Prescriptions    CIPROFLOXACIN (CIPRO) 500 MG TABLET    Take 1 tablet (500 mg) by mouth 2 times daily for 7 days    PHENAZOPYRIDINE (PHENAZO) 200 MG TABLET    Take 1 tablet (200 mg) by mouth 3 times daily as needed for irritation (Bladder spasm or pain from urinary tract infection)       Final diagnoses:   Acute cystitis without hematuria   History of prostate cancer   History of radical prostatectomy       1/6/2023   [facility] EMERGENCY DEPT     Khushi, Dante AUSTIN MD  01/06/23 4341

## 2023-01-06 NOTE — ED NOTES
Hx of prostate cancer pt wears depends for leaking. Pt having issues with urinary retention since last evening. Pt voided 50 ml here with a post void residual of 44 ml. Still need to collect a UA.

## 2023-01-06 NOTE — ED TRIAGE NOTES
Pt presents with urinary issue, LLQ, RLQ abd pressure, incontinent at baseline. Pt has a hx of prostatectomy in 2012. Pt denied hx of kidney stones, flank pain, N/V.       Triage Assessment     Row Name 01/06/23 0851       Triage Assessment (Adult)    Airway WDL WDL       Respiratory WDL    Respiratory WDL WDL       Skin Circulation/Temperature WDL    Skin Circulation/Temperature WDL WDL       Cardiac WDL    Cardiac WDL WDL       Peripheral/Neurovascular WDL    Peripheral Neurovascular WDL WDL       Cognitive/Neuro/Behavioral WDL    Cognitive/Neuro/Behavioral WDL WDL

## 2023-01-14 LAB
BACTERIA UR CULT: ABNORMAL
BACTERIA UR CULT: ABNORMAL